# Patient Record
Sex: FEMALE | Race: WHITE
[De-identification: names, ages, dates, MRNs, and addresses within clinical notes are randomized per-mention and may not be internally consistent; named-entity substitution may affect disease eponyms.]

---

## 2017-07-27 ENCOUNTER — HOSPITAL ENCOUNTER (EMERGENCY)
Dept: HOSPITAL 4 - SED | Age: 82
Discharge: HOME | End: 2017-07-27
Payer: MEDICARE

## 2017-07-27 VITALS — BODY MASS INDEX: 29.99 KG/M2 | HEIGHT: 65 IN | WEIGHT: 180 LBS

## 2017-07-27 VITALS — SYSTOLIC BLOOD PRESSURE: 132 MMHG

## 2017-07-27 VITALS — SYSTOLIC BLOOD PRESSURE: 147 MMHG

## 2017-07-27 DIAGNOSIS — I50.9: ICD-10-CM
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
## 2017-07-27 DIAGNOSIS — K92.1: ICD-10-CM

## 2017-07-27 DIAGNOSIS — Z88.8: ICD-10-CM
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
## 2017-07-27 DIAGNOSIS — Z79.899: ICD-10-CM
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
## 2017-07-27 DIAGNOSIS — Z95.0: ICD-10-CM

## 2017-07-27 DIAGNOSIS — S70.01XA: Primary | ICD-10-CM

## 2017-07-27 LAB
ALBUMIN SERPL BCP-MCNC: 3.5 G/DL (ref 3.4–4.8)
ALT SERPL W P-5'-P-CCNC: 21 U/L (ref 12–78)
ANION GAP SERPL CALCULATED.3IONS-SCNC: 6 MMOL/L (ref 5–15)
AST SERPL W P-5'-P-CCNC: 22 U/L (ref 10–37)
BASOPHILS # BLD AUTO: 0 K/UL (ref 0–0.2)
BASOPHILS NFR BLD AUTO: 1 % (ref 0–2)
BILIRUB SERPL-MCNC: 0.5 MG/DL (ref 0–1)
BUN SERPL-MCNC: 18 MG/DL (ref 8–21)
CALCIUM SERPL-MCNC: 8.6 MG/DL (ref 8.4–11)
CHLORIDE SERPL-SCNC: 93 MMOL/L (ref 98–107)
CREAT SERPL-MCNC: 1 MG/DL (ref 0.55–1.3)
EOSINOPHIL # BLD AUTO: 0.2 K/UL (ref 0–0.4)
EOSINOPHIL NFR BLD AUTO: 5.3 % (ref 0–4)
ERYTHROCYTE [DISTWIDTH] IN BLOOD BY AUTOMATED COUNT: 12.6 % (ref 9–15)
GFR SERPL CREATININE-BSD FRML MDRD: (no result) ML/MIN (ref 90–?)
GLUCOSE SERPL-MCNC: 113 MG/DL (ref 70–99)
HCT VFR BLD AUTO: 31.7 % (ref 36–48)
HGB BLD-MCNC: 10.5 G/DL (ref 12–16)
INR PPP: 1.1 (ref 0.8–1.2)
LYMPHOCYTES # BLD AUTO: 0.9 K/UL (ref 1–5.5)
LYMPHOCYTES NFR BLD AUTO: 19.8 % (ref 20.5–51.5)
MCH RBC QN AUTO: 32 PG (ref 27–31)
MCHC RBC AUTO-ENTMCNC: 33 % (ref 32–36)
MCV RBC AUTO: 96 FL (ref 79–98)
MONOCYTES # BLD MANUAL: 0.6 K/UL (ref 0–1)
MONOCYTES # BLD MANUAL: 14.3 % (ref 1.7–9.3)
NEUTROPHILS # BLD AUTO: 2.8 K/UL (ref 1.8–7.7)
NEUTROPHILS NFR BLD AUTO: 59.6 % (ref 40–70)
PLATELET # BLD AUTO: 178 K/UL (ref 130–430)
POTASSIUM SERPL-SCNC: 4.3 MMOL/L (ref 3.5–5.1)
PROT SERPL-MCNC: 7.1 G/DL (ref 6.4–8.3)
PROTHROMBIN TIME: 11.4 SECS (ref 9.5–12.5)
RBC # BLD AUTO: 3.31 MIL/UL (ref 4.2–6.2)
SODIUM SERPLBLD-SCNC: 125 MMOL/L (ref 136–145)
WBC # BLD AUTO: 4.5 K/UL (ref 4.8–10.8)

## 2017-07-27 PROCEDURE — 96375 TX/PRO/DX INJ NEW DRUG ADDON: CPT

## 2017-07-27 PROCEDURE — 87040 BLOOD CULTURE FOR BACTERIA: CPT

## 2017-07-27 PROCEDURE — 83605 ASSAY OF LACTIC ACID: CPT

## 2017-07-27 PROCEDURE — 84484 ASSAY OF TROPONIN QUANT: CPT

## 2017-07-27 PROCEDURE — 36415 COLL VENOUS BLD VENIPUNCTURE: CPT

## 2017-07-27 PROCEDURE — 85610 PROTHROMBIN TIME: CPT

## 2017-07-27 PROCEDURE — 80053 COMPREHEN METABOLIC PANEL: CPT

## 2017-07-27 PROCEDURE — 71010: CPT

## 2017-07-27 PROCEDURE — 83880 ASSAY OF NATRIURETIC PEPTIDE: CPT

## 2017-07-27 PROCEDURE — 85025 COMPLETE CBC W/AUTO DIFF WBC: CPT

## 2017-07-27 PROCEDURE — 93005 ELECTROCARDIOGRAM TRACING: CPT

## 2017-07-27 PROCEDURE — 99285 EMERGENCY DEPT VISIT HI MDM: CPT

## 2017-07-27 PROCEDURE — 96365 THER/PROPH/DIAG IV INF INIT: CPT

## 2017-07-27 PROCEDURE — C9113 INJ PANTOPRAZOLE SODIUM, VIA: HCPCS

## 2017-07-27 NOTE — NUR
Patient to ED for eval of low back, left hip and arm pain s/p non-syncopal fall 
on 7/5/17. Seen at outside hospital, had x-rays which were negative. Patient 
has had episodes of coughing up blood-taken off blood thinners per patient. 
Patient to bed 3 to await MD evaluation-will continue to observe and assess. 
Family remains at bedside.

## 2017-07-27 NOTE — NUR
Patient given written and verbal discharge instructions and verbalizes 
understanding.  ER MD JULIAN discussed with patient the results and treatment 
provided. Patient in stable condition. ID arm band removed. IV catheter removed 
intact and dressing applied, no active bleeding.

Rx of NORCO , OMEPRAZOLE, LASIX given. Patient educated on pain 
management and to follow up with PMD. Pain Scale 2/10.

Opportunity for questions provided and answered.

## 2023-10-15 ENCOUNTER — HOSPITAL ENCOUNTER (INPATIENT)
Dept: HOSPITAL 4 - SED | Age: 88
LOS: 7 days | Discharge: HOME HEALTH | DRG: 871 | End: 2023-10-22
Attending: INTERNAL MEDICINE | Admitting: INTERNAL MEDICINE
Payer: COMMERCIAL

## 2023-10-15 VITALS — WEIGHT: 216 LBS | HEIGHT: 65 IN | BODY MASS INDEX: 35.99 KG/M2

## 2023-10-15 VITALS — RESPIRATION RATE: 18 BRPM | OXYGEN SATURATION: 94 % | SYSTOLIC BLOOD PRESSURE: 110 MMHG | TEMPERATURE: 98.4 F

## 2023-10-15 VITALS — OXYGEN SATURATION: 100 %

## 2023-10-15 VITALS — HEART RATE: 74 BPM | TEMPERATURE: 98.2 F | RESPIRATION RATE: 18 BRPM | SYSTOLIC BLOOD PRESSURE: 109 MMHG

## 2023-10-15 VITALS
HEART RATE: 75 BPM | TEMPERATURE: 98.3 F | RESPIRATION RATE: 20 BRPM | OXYGEN SATURATION: 98 % | SYSTOLIC BLOOD PRESSURE: 146 MMHG

## 2023-10-15 DIAGNOSIS — E44.1: ICD-10-CM

## 2023-10-15 DIAGNOSIS — Z20.822: ICD-10-CM

## 2023-10-15 DIAGNOSIS — Z90.49: ICD-10-CM

## 2023-10-15 DIAGNOSIS — I50.43: ICD-10-CM

## 2023-10-15 DIAGNOSIS — L03.116: ICD-10-CM

## 2023-10-15 DIAGNOSIS — D62: ICD-10-CM

## 2023-10-15 DIAGNOSIS — Z79.899: ICD-10-CM

## 2023-10-15 DIAGNOSIS — R65.21: ICD-10-CM

## 2023-10-15 DIAGNOSIS — Z95.0: ICD-10-CM

## 2023-10-15 DIAGNOSIS — Z88.6: ICD-10-CM

## 2023-10-15 DIAGNOSIS — J96.00: ICD-10-CM

## 2023-10-15 DIAGNOSIS — N18.9: ICD-10-CM

## 2023-10-15 DIAGNOSIS — K92.2: ICD-10-CM

## 2023-10-15 DIAGNOSIS — E80.6: ICD-10-CM

## 2023-10-15 DIAGNOSIS — Z79.01: ICD-10-CM

## 2023-10-15 DIAGNOSIS — I34.0: ICD-10-CM

## 2023-10-15 DIAGNOSIS — D61.818: ICD-10-CM

## 2023-10-15 DIAGNOSIS — Z92.21: ICD-10-CM

## 2023-10-15 DIAGNOSIS — Z86.73: ICD-10-CM

## 2023-10-15 DIAGNOSIS — B96.20: ICD-10-CM

## 2023-10-15 DIAGNOSIS — L03.115: ICD-10-CM

## 2023-10-15 DIAGNOSIS — Z86.79: ICD-10-CM

## 2023-10-15 DIAGNOSIS — A41.9: Primary | ICD-10-CM

## 2023-10-15 DIAGNOSIS — I31.39: ICD-10-CM

## 2023-10-15 DIAGNOSIS — Z85.3: ICD-10-CM

## 2023-10-15 DIAGNOSIS — N39.0: ICD-10-CM

## 2023-10-15 DIAGNOSIS — N17.0: ICD-10-CM

## 2023-10-15 DIAGNOSIS — J18.9: ICD-10-CM

## 2023-10-15 DIAGNOSIS — I87.8: ICD-10-CM

## 2023-10-15 DIAGNOSIS — I48.20: ICD-10-CM

## 2023-10-15 DIAGNOSIS — R58: ICD-10-CM

## 2023-10-15 DIAGNOSIS — I13.0: ICD-10-CM

## 2023-10-15 LAB
ALBUMIN SERPL BCP-MCNC: 3.4 G/DL (ref 3.4–4.8)
ALT SERPL W P-5'-P-CCNC: 17 U/L (ref 12–78)
AMYLASE SERPL-CCNC: 33 U/L (ref 0–100)
ANION GAP SERPL CALCULATED.3IONS-SCNC: 10 MMOL/L (ref 5–15)
ANISOCYTOSIS BLD QL: (no result)
AST SERPL W P-5'-P-CCNC: 29 U/L (ref 10–37)
BASOPHILS NFR BLD MANUAL: 0 % (ref 0–2)
BILIRUB SERPL-MCNC: 2.6 MG/DL (ref 0–1)
BUN SERPL-MCNC: 20 MG/DL (ref 8–21)
CALCIUM SERPL-MCNC: 9.7 MG/DL (ref 8.4–11)
CHLORIDE SERPL-SCNC: 106 MMOL/L (ref 98–107)
CO2 SERPLBLD-SCNC: 26 MMOL/L (ref 23–29)
CREAT SERPL-MCNC: 0.95 MG/DL (ref 0.55–1.3)
EOSINOPHIL NFR BLD MANUAL: 0 % (ref 0–7)
ERYTHROCYTE [DISTWIDTH] IN BLOOD BY AUTOMATED COUNT: 14.6 % (ref 9–15)
GFR SERPL CREATININE-BSD FRML MDRD: (no result) ML/MIN (ref 90–?)
GLUCOSE SERPL-MCNC: 98 MG/DL (ref 74–106)
HCT VFR BLD AUTO: 35.9 % (ref 36–48)
HGB BLD-MCNC: 11.6 G/DL (ref 12–16)
HYPOCHROMIA BLD QL: (no result)
INR PPP: 1.3 (ref 0.8–1.2)
LIPASE SERPL-CCNC: 49 U/L (ref 73–393)
LYMPHOCYTES NFR BLD MANUAL: 12 % (ref 20–46)
MACROCYTES BLD QL: SLIGHT
MCH RBC QN AUTO: 33 PG (ref 27–31)
MCHC RBC AUTO-ENTMCNC: 32 % (ref 32–36)
MCV RBC AUTO: 103 FL (ref 79–98)
MONOCYTES # BLD MANUAL: 3 % (ref 0–11)
NEUTS BAND NFR BLD MANUAL: 25 % (ref 0–6)
PLATELET # BLD AUTO: 92 K/UL (ref 130–430)
PLATELET BLD QL SMEAR: (no result)
POTASSIUM SERPL-SCNC: 3.6 MMOL/L (ref 3.5–5.1)
PROT SERPL-MCNC: 8.1 G/DL (ref 6.4–8.3)
PROTHROMBIN TIME: 13.5 SECS (ref 9.5–12.5)
RBC # BLD AUTO: 3.5 MIL/UL (ref 4.2–6.2)
SMUDGE CELLS BLD QL SMEAR: (no result)
SODIUM SERPLBLD-SCNC: 142 MMOL/L (ref 136–145)
VARIANT LYMPHS NFR BLD MANUAL: 0 % (ref 0–0)
WBC # BLD AUTO: 4.4 K/UL (ref 4.8–10.8)

## 2023-10-15 PROCEDURE — P9046 ALBUMIN (HUMAN), 25%, 20 ML: HCPCS

## 2023-10-15 PROCEDURE — G0378 HOSPITAL OBSERVATION PER HR: HCPCS

## 2023-10-15 PROCEDURE — C9113 INJ PANTOPRAZOLE SODIUM, VIA: HCPCS

## 2023-10-15 PROCEDURE — P9059 PLASMA, FRZ BETWEEN 8-24HOUR: HCPCS

## 2023-10-15 RX ADMIN — SODIUM CHLORIDE SCH MG: 9 INJECTION, SOLUTION INTRAVENOUS at 21:57

## 2023-10-15 RX ADMIN — BUMETANIDE SCH MG: 1 TABLET ORAL at 20:39

## 2023-10-16 VITALS
RESPIRATION RATE: 20 BRPM | TEMPERATURE: 98.5 F | SYSTOLIC BLOOD PRESSURE: 88 MMHG | HEART RATE: 77 BPM | OXYGEN SATURATION: 92 %

## 2023-10-16 VITALS — SYSTOLIC BLOOD PRESSURE: 99 MMHG | RESPIRATION RATE: 29 BRPM | OXYGEN SATURATION: 89 % | HEART RATE: 79 BPM

## 2023-10-16 VITALS
HEART RATE: 65 BPM | RESPIRATION RATE: 18 BRPM | OXYGEN SATURATION: 93 % | SYSTOLIC BLOOD PRESSURE: 94 MMHG | TEMPERATURE: 99.7 F

## 2023-10-16 VITALS — SYSTOLIC BLOOD PRESSURE: 110 MMHG | RESPIRATION RATE: 10 BRPM | OXYGEN SATURATION: 89 % | HEART RATE: 81 BPM

## 2023-10-16 VITALS — OXYGEN SATURATION: 2 %

## 2023-10-16 VITALS
TEMPERATURE: 98.6 F | SYSTOLIC BLOOD PRESSURE: 99 MMHG | RESPIRATION RATE: 19 BRPM | OXYGEN SATURATION: 92 % | HEART RATE: 79 BPM

## 2023-10-16 VITALS — SYSTOLIC BLOOD PRESSURE: 97 MMHG | RESPIRATION RATE: 16 BRPM | OXYGEN SATURATION: 90 % | HEART RATE: 80 BPM

## 2023-10-16 VITALS
RESPIRATION RATE: 18 BRPM | HEART RATE: 76 BPM | SYSTOLIC BLOOD PRESSURE: 94 MMHG | TEMPERATURE: 98.4 F | OXYGEN SATURATION: 95 %

## 2023-10-16 VITALS — OXYGEN SATURATION: 94 %

## 2023-10-16 LAB
ALBUMIN SERPL BCP-MCNC: 2.6 G/DL (ref 3.4–4.8)
ALT SERPL W P-5'-P-CCNC: 17 U/L (ref 12–78)
ANION GAP SERPL CALCULATED.3IONS-SCNC: 10 MMOL/L (ref 5–15)
AST SERPL W P-5'-P-CCNC: 29 U/L (ref 10–37)
BASOPHILS # BLD AUTO: 0 K/UL (ref 0–0.2)
BASOPHILS NFR BLD AUTO: 0.1 % (ref 0–2)
BILIRUB SERPL-MCNC: 3.9 MG/DL (ref 0–1)
BUN SERPL-MCNC: 30 MG/DL (ref 8–21)
CALCIUM SERPL-MCNC: 9 MG/DL (ref 8.4–11)
CHLORIDE SERPL-SCNC: 106 MMOL/L (ref 98–107)
CO2 SERPLBLD-SCNC: 25 MMOL/L (ref 23–29)
CREAT SERPL-MCNC: 1.35 MG/DL (ref 0.55–1.3)
EOSINOPHIL # BLD AUTO: 0 K/UL (ref 0–0.4)
EOSINOPHIL NFR BLD AUTO: 0.1 % (ref 0–4)
ERYTHROCYTE [DISTWIDTH] IN BLOOD BY AUTOMATED COUNT: 14.7 % (ref 9–15)
GFR SERPL CREATININE-BSD FRML MDRD: (no result) ML/MIN (ref 90–?)
GLUCOSE SERPL-MCNC: 94 MG/DL (ref 74–106)
HCT VFR BLD AUTO: 32.3 % (ref 36–48)
HGB BLD-MCNC: 10.7 G/DL (ref 12–16)
INR PPP: 1.7 (ref 0.8–1.2)
LYMPHOCYTES # BLD AUTO: 0.3 K/UL (ref 1–5.5)
LYMPHOCYTES NFR BLD AUTO: 4.9 % (ref 20.5–51.5)
MCH RBC QN AUTO: 33 PG (ref 27–31)
MCHC RBC AUTO-ENTMCNC: 33 % (ref 32–36)
MCV RBC AUTO: 101 FL (ref 79–98)
MONOCYTES # BLD MANUAL: 0.5 K/UL (ref 0–1)
MONOCYTES # BLD MANUAL: 8.3 % (ref 1.7–9.3)
NEUTROPHILS # BLD AUTO: 5.7 K/UL (ref 1.8–7.7)
NEUTROPHILS NFR BLD AUTO: 86.6 % (ref 40–70)
PLATELET # BLD AUTO: 72 K/UL (ref 130–430)
POTASSIUM SERPL-SCNC: 3.9 MMOL/L (ref 3.5–5.1)
PROT SERPL-MCNC: 6.7 G/DL (ref 6.4–8.3)
PROTHROMBIN TIME: 17 SECS (ref 9.5–12.5)
RBC # BLD AUTO: 3.19 MIL/UL (ref 4.2–6.2)
SODIUM SERPLBLD-SCNC: 141 MMOL/L (ref 136–145)
T4 FREE SERPL-MCNC: 1.3 NG/DL (ref 0.6–1.6)
TSH SERPL DL<=0.05 MIU/L-ACNC: 1.04 UIU/ML (ref 0.34–4.82)
WBC # BLD AUTO: 6.5 K/UL (ref 4.8–10.8)

## 2023-10-16 PROCEDURE — 30233K1 TRANSFUSION OF NONAUTOLOGOUS FROZEN PLASMA INTO PERIPHERAL VEIN, PERCUTANEOUS APPROACH: ICD-10-PCS | Performed by: INTERNAL MEDICINE

## 2023-10-16 RX ADMIN — MIDODRINE HYDROCHLORIDE SCH MG: 5 TABLET ORAL at 21:00

## 2023-10-16 RX ADMIN — SODIUM CHLORIDE SCH MLS/HR: 9 INJECTION, SOLUTION INTRAVENOUS at 17:45

## 2023-10-16 RX ADMIN — SODIUM CHLORIDE SCH MG: 9 INJECTION, SOLUTION INTRAVENOUS at 12:46

## 2023-10-16 RX ADMIN — AMIODARONE HYDROCHLORIDE SCH MG: 200 TABLET ORAL at 09:00

## 2023-10-16 RX ADMIN — BUMETANIDE SCH MG: 1 TABLET ORAL at 09:00

## 2023-10-16 RX ADMIN — MINERAL SUPPLEMENT IRON 300 MG / 5 ML STRENGTH LIQUID 100 PER BOX UNFLAVORED SCH MG: at 09:49

## 2023-10-16 RX ADMIN — BUMETANIDE SCH MG: 1 TABLET ORAL at 22:06

## 2023-10-16 RX ADMIN — Medication SCH MG: at 09:00

## 2023-10-16 RX ADMIN — PIPERACILLIN AND TAZOBACTAM SCH MLS/HR: 2; .25 INJECTION, POWDER, LYOPHILIZED, FOR SOLUTION INTRAVENOUS; PARENTERAL at 22:05

## 2023-10-16 RX ADMIN — DIGOXIN SCH MG: 125 TABLET ORAL at 09:00

## 2023-10-16 RX ADMIN — SODIUM CHLORIDE SCH MG: 9 INJECTION, SOLUTION INTRAVENOUS at 21:10

## 2023-10-16 RX ADMIN — Medication SCH UNIT: at 09:49

## 2023-10-16 RX ADMIN — PIPERACILLIN AND TAZOBACTAM SCH MLS/HR: 2; .25 INJECTION, POWDER, LYOPHILIZED, FOR SOLUTION INTRAVENOUS; PARENTERAL at 17:20

## 2023-10-16 RX ADMIN — MIDODRINE HYDROCHLORIDE SCH MG: 5 TABLET ORAL at 15:00

## 2023-10-17 VITALS — RESPIRATION RATE: 18 BRPM | SYSTOLIC BLOOD PRESSURE: 102 MMHG | OXYGEN SATURATION: 91 % | HEART RATE: 81 BPM

## 2023-10-17 VITALS
OXYGEN SATURATION: 98 % | HEART RATE: 81 BPM | RESPIRATION RATE: 22 BRPM | TEMPERATURE: 97.8 F | SYSTOLIC BLOOD PRESSURE: 103 MMHG

## 2023-10-17 VITALS — HEART RATE: 78 BPM | SYSTOLIC BLOOD PRESSURE: 91 MMHG | RESPIRATION RATE: 17 BRPM | OXYGEN SATURATION: 93 %

## 2023-10-17 VITALS
OXYGEN SATURATION: 97 % | SYSTOLIC BLOOD PRESSURE: 98 MMHG | HEART RATE: 80 BPM | RESPIRATION RATE: 21 BRPM | TEMPERATURE: 97.9 F

## 2023-10-17 VITALS
TEMPERATURE: 98.1 F | RESPIRATION RATE: 22 BRPM | HEART RATE: 80 BPM | SYSTOLIC BLOOD PRESSURE: 95 MMHG | OXYGEN SATURATION: 93 %

## 2023-10-17 VITALS — OXYGEN SATURATION: 95 % | SYSTOLIC BLOOD PRESSURE: 104 MMHG | RESPIRATION RATE: 20 BRPM | HEART RATE: 79 BPM

## 2023-10-17 VITALS
SYSTOLIC BLOOD PRESSURE: 93 MMHG | RESPIRATION RATE: 21 BRPM | TEMPERATURE: 98.6 F | HEART RATE: 78 BPM | OXYGEN SATURATION: 92 %

## 2023-10-17 VITALS
RESPIRATION RATE: 18 BRPM | OXYGEN SATURATION: 93 % | HEART RATE: 79 BPM | SYSTOLIC BLOOD PRESSURE: 103 MMHG | TEMPERATURE: 98.1 F

## 2023-10-17 VITALS
TEMPERATURE: 97.8 F | SYSTOLIC BLOOD PRESSURE: 121 MMHG | RESPIRATION RATE: 23 BRPM | OXYGEN SATURATION: 94 % | HEART RATE: 79 BPM

## 2023-10-17 VITALS
HEART RATE: 79 BPM | RESPIRATION RATE: 16 BRPM | SYSTOLIC BLOOD PRESSURE: 96 MMHG | TEMPERATURE: 98.1 F | OXYGEN SATURATION: 96 %

## 2023-10-17 VITALS
SYSTOLIC BLOOD PRESSURE: 103 MMHG | HEART RATE: 79 BPM | RESPIRATION RATE: 21 BRPM | OXYGEN SATURATION: 93 % | TEMPERATURE: 98.1 F

## 2023-10-17 VITALS — HEART RATE: 81 BPM | OXYGEN SATURATION: 94 % | RESPIRATION RATE: 22 BRPM | SYSTOLIC BLOOD PRESSURE: 104 MMHG

## 2023-10-17 VITALS
SYSTOLIC BLOOD PRESSURE: 97 MMHG | TEMPERATURE: 98 F | RESPIRATION RATE: 14 BRPM | OXYGEN SATURATION: 94 % | HEART RATE: 81 BPM

## 2023-10-17 VITALS
RESPIRATION RATE: 16 BRPM | TEMPERATURE: 98.2 F | HEART RATE: 78 BPM | SYSTOLIC BLOOD PRESSURE: 93 MMHG | OXYGEN SATURATION: 94 %

## 2023-10-17 VITALS — RESPIRATION RATE: 15 BRPM | HEART RATE: 78 BPM | OXYGEN SATURATION: 92 % | SYSTOLIC BLOOD PRESSURE: 90 MMHG

## 2023-10-17 VITALS
OXYGEN SATURATION: 95 % | RESPIRATION RATE: 17 BRPM | SYSTOLIC BLOOD PRESSURE: 97 MMHG | TEMPERATURE: 98 F | HEART RATE: 79 BPM

## 2023-10-17 VITALS — OXYGEN SATURATION: 93 % | HEART RATE: 80 BPM | RESPIRATION RATE: 18 BRPM | SYSTOLIC BLOOD PRESSURE: 93 MMHG

## 2023-10-17 VITALS
RESPIRATION RATE: 19 BRPM | TEMPERATURE: 98.1 F | SYSTOLIC BLOOD PRESSURE: 98 MMHG | HEART RATE: 79 BPM | OXYGEN SATURATION: 93 %

## 2023-10-17 VITALS — OXYGEN SATURATION: 96 %

## 2023-10-17 VITALS — SYSTOLIC BLOOD PRESSURE: 91 MMHG | HEART RATE: 79 BPM | OXYGEN SATURATION: 91 % | RESPIRATION RATE: 18 BRPM

## 2023-10-17 VITALS
HEART RATE: 78 BPM | RESPIRATION RATE: 22 BRPM | TEMPERATURE: 97.7 F | OXYGEN SATURATION: 95 % | SYSTOLIC BLOOD PRESSURE: 114 MMHG

## 2023-10-17 VITALS
RESPIRATION RATE: 20 BRPM | OXYGEN SATURATION: 96 % | TEMPERATURE: 97.8 F | SYSTOLIC BLOOD PRESSURE: 92 MMHG | HEART RATE: 78 BPM

## 2023-10-17 VITALS — HEART RATE: 80 BPM | OXYGEN SATURATION: 97 % | SYSTOLIC BLOOD PRESSURE: 98 MMHG

## 2023-10-17 VITALS
SYSTOLIC BLOOD PRESSURE: 97 MMHG | HEART RATE: 79 BPM | OXYGEN SATURATION: 95 % | RESPIRATION RATE: 23 BRPM | TEMPERATURE: 97.9 F

## 2023-10-17 VITALS — HEART RATE: 81 BPM | OXYGEN SATURATION: 92 % | RESPIRATION RATE: 14 BRPM | SYSTOLIC BLOOD PRESSURE: 89 MMHG

## 2023-10-17 VITALS — SYSTOLIC BLOOD PRESSURE: 101 MMHG | OXYGEN SATURATION: 96 % | RESPIRATION RATE: 20 BRPM | HEART RATE: 78 BPM

## 2023-10-17 VITALS — OXYGEN SATURATION: 98 %

## 2023-10-17 VITALS — OXYGEN SATURATION: 94 %

## 2023-10-17 LAB
ALBUMIN SERPL BCP-MCNC: 2.6 G/DL (ref 3.4–4.8)
ALT SERPL W P-5'-P-CCNC: 18 U/L (ref 12–78)
ANION GAP SERPL CALCULATED.3IONS-SCNC: 7 MMOL/L (ref 5–15)
APPEARANCE UR: CLEAR
ARTERIAL PATENCY WRIST A: POSITIVE
AST SERPL W P-5'-P-CCNC: 24 U/L (ref 10–37)
BACTERIA URNS QL MICRO: (no result) /HPF
BASE EXCESS BLDA CALC-SCNC: -3 MMOL/L (ref -3–3)
BASOPHILS # BLD AUTO: 0 K/UL (ref 0–0.2)
BASOPHILS NFR BLD AUTO: 0.1 % (ref 0–2)
BILIRUB SERPL-MCNC: 3.3 MG/DL (ref 0–1)
BILIRUB UR QL STRIP: (no result)
BUN SERPL-MCNC: 41 MG/DL (ref 8–21)
CALCIUM SERPL-MCNC: 8.7 MG/DL (ref 8.4–11)
CHLORIDE SERPL-SCNC: 105 MMOL/L (ref 98–107)
CHOLEST SERPL-MCNC: 69 MG/DL (ref ?–200)
CO2 SERPLBLD-SCNC: 26 MMOL/L (ref 23–29)
COLOR UR: YELLOW
CREAT SERPL-MCNC: 1.78 MG/DL (ref 0.55–1.3)
DIGOXIN SERPL-MCNC: 0.5 NG/ML (ref 0.8–2)
EOSINOPHIL # BLD AUTO: 0 K/UL (ref 0–0.4)
EOSINOPHIL NFR BLD AUTO: 0.3 % (ref 0–4)
ERYTHROCYTE [DISTWIDTH] IN BLOOD BY AUTOMATED COUNT: 15 % (ref 9–15)
GFR SERPL CREATININE-BSD FRML MDRD: (no result) ML/MIN (ref 90–?)
GLUCOSE SERPL-MCNC: 95 MG/DL (ref 74–106)
GLUCOSE UR STRIP-MCNC: NEGATIVE MG/DL
HCO3 BLDA-SCNC: 23.6 MMOL/L (ref 21–27)
HCT VFR BLD AUTO: 32.3 % (ref 36–48)
HDLC SERPL-MCNC: 34 MG/DL (ref 55–?)
HGB BLD-MCNC: 10.4 G/DL (ref 12–16)
HGB UR QL STRIP: (no result)
INHALED O2 CONCENTRATION: 32 % (ref 0.21–100)
INR PPP: 1.6 (ref 0.8–1.2)
KETONES UR STRIP-MCNC: NEGATIVE MG/DL
LEUKOCYTE ESTERASE UR QL STRIP: (no result)
LYMPHOCYTES # BLD AUTO: 0.4 K/UL (ref 1–5.5)
LYMPHOCYTES NFR BLD AUTO: 3.9 % (ref 20.5–51.5)
MCH RBC QN AUTO: 33 PG (ref 27–31)
MCHC RBC AUTO-ENTMCNC: 32 % (ref 32–36)
MCV RBC AUTO: 103 FL (ref 79–98)
MONOCYTES # BLD MANUAL: 1 K/UL (ref 0–1)
MONOCYTES # BLD MANUAL: 10.2 % (ref 1.7–9.3)
NEUTROPHILS # BLD AUTO: 8.4 K/UL (ref 1.8–7.7)
NEUTROPHILS NFR BLD AUTO: 85.5 % (ref 40–70)
NITRITE UR QL STRIP: NEGATIVE
PCO2 BLDA: 47.9 MMHG (ref 35–45)
PH BLDA: 7.31 [PH] (ref 7.35–7.45)
PH UR STRIP: 5.5 [PH] (ref 5–8)
PLATELET # BLD AUTO: 74 K/UL (ref 130–430)
PO2 BLDA: 96.1 MMHG (ref 75–100)
POTASSIUM SERPL-SCNC: 4.4 MMOL/L (ref 3.5–5.1)
PROT SERPL-MCNC: 6.8 G/DL (ref 6.4–8.3)
PROT UR QL STRIP: (no result)
PROTHROMBIN TIME: 16 SECS (ref 9.5–12.5)
RBC # BLD AUTO: 3.15 MIL/UL (ref 4.2–6.2)
RBC #/AREA URNS HPF: (no result) /HPF (ref 0–3)
SAO2 % BLDA: 96.7 % (ref 94–100)
SODIUM SERPLBLD-SCNC: 138 MMOL/L (ref 136–145)
SP GR UR STRIP: 1.02 (ref 1–1.03)
T4 FREE SERPL-MCNC: 1.2 NG/DL (ref 0.6–1.6)
TRIGL SERPL-MCNC: 47 MG/DL (ref 30–150)
TSH SERPL DL<=0.05 MIU/L-ACNC: 1.17 UIU/ML (ref 0.34–4.82)
UROBILINOGEN UR STRIP-MCNC: 1 MG/DL (ref 0.2–1)
WBC # BLD AUTO: 9.8 K/UL (ref 4.8–10.8)
WBC #/AREA URNS HPF: (no result) /HPF (ref 0–3)

## 2023-10-17 RX ADMIN — AMIODARONE HYDROCHLORIDE SCH MG: 200 TABLET ORAL at 08:21

## 2023-10-17 RX ADMIN — PIPERACILLIN AND TAZOBACTAM SCH MLS/HR: 2; .25 INJECTION, POWDER, LYOPHILIZED, FOR SOLUTION INTRAVENOUS; PARENTERAL at 21:46

## 2023-10-17 RX ADMIN — ALBUMIN (HUMAN) SCH MLS/HR: 5 SOLUTION INTRAVENOUS at 21:39

## 2023-10-17 RX ADMIN — PROPARACAINE HYDROCHLORIDE PRN ML: 5 SOLUTION/ DROPS OPHTHALMIC at 15:23

## 2023-10-17 RX ADMIN — PIPERACILLIN AND TAZOBACTAM SCH MLS/HR: 2; .25 INJECTION, POWDER, LYOPHILIZED, FOR SOLUTION INTRAVENOUS; PARENTERAL at 15:30

## 2023-10-17 RX ADMIN — MIDODRINE HYDROCHLORIDE SCH MG: 5 TABLET ORAL at 15:24

## 2023-10-17 RX ADMIN — SODIUM CHLORIDE SCH MG: 9 INJECTION, SOLUTION INTRAVENOUS at 21:39

## 2023-10-17 RX ADMIN — Medication SCH UNIT: at 08:24

## 2023-10-17 RX ADMIN — Medication SCH MG: at 08:23

## 2023-10-17 RX ADMIN — DIGOXIN SCH MG: 125 TABLET ORAL at 08:22

## 2023-10-17 RX ADMIN — MIDODRINE HYDROCHLORIDE SCH MG: 5 TABLET ORAL at 08:24

## 2023-10-17 RX ADMIN — SODIUM CHLORIDE SCH MLS/HR: 9 INJECTION, SOLUTION INTRAVENOUS at 13:55

## 2023-10-17 RX ADMIN — BUMETANIDE SCH MG: 1 TABLET ORAL at 21:45

## 2023-10-17 RX ADMIN — PIPERACILLIN AND TAZOBACTAM SCH MLS/HR: 2; .25 INJECTION, POWDER, LYOPHILIZED, FOR SOLUTION INTRAVENOUS; PARENTERAL at 09:38

## 2023-10-17 RX ADMIN — MIDODRINE HYDROCHLORIDE SCH MG: 5 TABLET ORAL at 21:45

## 2023-10-17 RX ADMIN — SODIUM CHLORIDE SCH MLS/HR: 9 INJECTION, SOLUTION INTRAVENOUS at 21:46

## 2023-10-17 RX ADMIN — ALBUMIN (HUMAN) SCH MLS/HR: 5 SOLUTION INTRAVENOUS at 07:50

## 2023-10-17 RX ADMIN — MINERAL SUPPLEMENT IRON 300 MG / 5 ML STRENGTH LIQUID 100 PER BOX UNFLAVORED SCH MG: at 08:22

## 2023-10-17 RX ADMIN — ALBUMIN (HUMAN) SCH MLS/HR: 5 SOLUTION INTRAVENOUS at 13:55

## 2023-10-17 RX ADMIN — PIPERACILLIN AND TAZOBACTAM SCH MLS/HR: 2; .25 INJECTION, POWDER, LYOPHILIZED, FOR SOLUTION INTRAVENOUS; PARENTERAL at 03:38

## 2023-10-17 RX ADMIN — BUMETANIDE SCH MG: 1 TABLET ORAL at 08:22

## 2023-10-17 RX ADMIN — PROPARACAINE HYDROCHLORIDE PRN ML: 5 SOLUTION/ DROPS OPHTHALMIC at 21:39

## 2023-10-17 RX ADMIN — SODIUM CHLORIDE SCH MG: 9 INJECTION, SOLUTION INTRAVENOUS at 08:29

## 2023-10-17 RX ADMIN — SODIUM CHLORIDE SCH MLS/HR: 9 INJECTION, SOLUTION INTRAVENOUS at 07:49

## 2023-10-17 RX ADMIN — SODIUM CHLORIDE SCH MLS/HR: 0.9 INJECTION, SOLUTION INTRAVENOUS at 12:46

## 2023-10-18 VITALS
OXYGEN SATURATION: 97 % | HEART RATE: 74 BPM | RESPIRATION RATE: 22 BRPM | SYSTOLIC BLOOD PRESSURE: 109 MMHG | TEMPERATURE: 98 F

## 2023-10-18 VITALS
SYSTOLIC BLOOD PRESSURE: 104 MMHG | HEART RATE: 78 BPM | TEMPERATURE: 97.8 F | OXYGEN SATURATION: 97 % | RESPIRATION RATE: 17 BRPM

## 2023-10-18 VITALS
SYSTOLIC BLOOD PRESSURE: 108 MMHG | TEMPERATURE: 97.8 F | RESPIRATION RATE: 18 BRPM | HEART RATE: 77 BPM | OXYGEN SATURATION: 98 %

## 2023-10-18 VITALS
HEART RATE: 76 BPM | SYSTOLIC BLOOD PRESSURE: 106 MMHG | RESPIRATION RATE: 20 BRPM | OXYGEN SATURATION: 95 % | TEMPERATURE: 97.6 F

## 2023-10-18 VITALS — RESPIRATION RATE: 21 BRPM | OXYGEN SATURATION: 91 % | HEART RATE: 76 BPM | SYSTOLIC BLOOD PRESSURE: 105 MMHG

## 2023-10-18 VITALS — SYSTOLIC BLOOD PRESSURE: 102 MMHG | RESPIRATION RATE: 21 BRPM | OXYGEN SATURATION: 93 % | HEART RATE: 78 BPM

## 2023-10-18 VITALS
SYSTOLIC BLOOD PRESSURE: 105 MMHG | RESPIRATION RATE: 17 BRPM | HEART RATE: 84 BPM | OXYGEN SATURATION: 93 % | TEMPERATURE: 98 F

## 2023-10-18 VITALS
SYSTOLIC BLOOD PRESSURE: 105 MMHG | OXYGEN SATURATION: 96 % | RESPIRATION RATE: 17 BRPM | HEART RATE: 80 BPM | TEMPERATURE: 97.8 F

## 2023-10-18 VITALS
TEMPERATURE: 97.9 F | OXYGEN SATURATION: 98 % | RESPIRATION RATE: 18 BRPM | HEART RATE: 78 BPM | SYSTOLIC BLOOD PRESSURE: 100 MMHG

## 2023-10-18 VITALS
RESPIRATION RATE: 18 BRPM | TEMPERATURE: 97.6 F | OXYGEN SATURATION: 97 % | HEART RATE: 79 BPM | SYSTOLIC BLOOD PRESSURE: 114 MMHG

## 2023-10-18 VITALS
SYSTOLIC BLOOD PRESSURE: 102 MMHG | HEART RATE: 75 BPM | TEMPERATURE: 97.8 F | RESPIRATION RATE: 20 BRPM | OXYGEN SATURATION: 96 %

## 2023-10-18 VITALS
HEART RATE: 79 BPM | OXYGEN SATURATION: 98 % | SYSTOLIC BLOOD PRESSURE: 119 MMHG | RESPIRATION RATE: 21 BRPM | TEMPERATURE: 97.6 F

## 2023-10-18 VITALS — SYSTOLIC BLOOD PRESSURE: 121 MMHG | RESPIRATION RATE: 20 BRPM | HEART RATE: 80 BPM | OXYGEN SATURATION: 98 %

## 2023-10-18 VITALS
OXYGEN SATURATION: 94 % | SYSTOLIC BLOOD PRESSURE: 96 MMHG | TEMPERATURE: 97.8 F | HEART RATE: 88 BPM | RESPIRATION RATE: 25 BRPM

## 2023-10-18 VITALS
OXYGEN SATURATION: 100 % | TEMPERATURE: 98 F | SYSTOLIC BLOOD PRESSURE: 120 MMHG | RESPIRATION RATE: 13 BRPM | HEART RATE: 80 BPM

## 2023-10-18 VITALS — HEART RATE: 76 BPM | RESPIRATION RATE: 21 BRPM | SYSTOLIC BLOOD PRESSURE: 106 MMHG | OXYGEN SATURATION: 92 %

## 2023-10-18 VITALS
OXYGEN SATURATION: 96 % | TEMPERATURE: 98.1 F | HEART RATE: 77 BPM | SYSTOLIC BLOOD PRESSURE: 109 MMHG | RESPIRATION RATE: 22 BRPM

## 2023-10-18 VITALS — SYSTOLIC BLOOD PRESSURE: 109 MMHG | OXYGEN SATURATION: 94 % | HEART RATE: 76 BPM | RESPIRATION RATE: 20 BRPM

## 2023-10-18 VITALS
RESPIRATION RATE: 22 BRPM | HEART RATE: 78 BPM | TEMPERATURE: 98 F | OXYGEN SATURATION: 94 % | SYSTOLIC BLOOD PRESSURE: 108 MMHG

## 2023-10-18 VITALS — RESPIRATION RATE: 20 BRPM | SYSTOLIC BLOOD PRESSURE: 105 MMHG | OXYGEN SATURATION: 96 % | HEART RATE: 78 BPM

## 2023-10-18 VITALS — HEART RATE: 75 BPM | SYSTOLIC BLOOD PRESSURE: 106 MMHG | OXYGEN SATURATION: 95 % | RESPIRATION RATE: 23 BRPM

## 2023-10-18 VITALS — OXYGEN SATURATION: 97 %

## 2023-10-18 VITALS — SYSTOLIC BLOOD PRESSURE: 121 MMHG | RESPIRATION RATE: 22 BRPM | HEART RATE: 80 BPM | OXYGEN SATURATION: 88 %

## 2023-10-18 VITALS
OXYGEN SATURATION: 97 % | SYSTOLIC BLOOD PRESSURE: 104 MMHG | HEART RATE: 81 BPM | TEMPERATURE: 97.9 F | RESPIRATION RATE: 22 BRPM

## 2023-10-18 VITALS — HEART RATE: 79 BPM | OXYGEN SATURATION: 99 % | SYSTOLIC BLOOD PRESSURE: 106 MMHG | RESPIRATION RATE: 23 BRPM

## 2023-10-18 LAB
ALBUMIN SERPL BCP-MCNC: 3 G/DL (ref 3.4–4.8)
ALT SERPL W P-5'-P-CCNC: 15 U/L (ref 12–78)
ANION GAP SERPL CALCULATED.3IONS-SCNC: 8 MMOL/L (ref 5–15)
AST SERPL W P-5'-P-CCNC: 21 U/L (ref 10–37)
BASOPHILS # BLD AUTO: 0 K/UL (ref 0–0.2)
BASOPHILS NFR BLD AUTO: 0.2 % (ref 0–2)
BILIRUB SERPL-MCNC: 3.1 MG/DL (ref 0–1)
BUN SERPL-MCNC: 48 MG/DL (ref 8–21)
CALCIUM SERPL-MCNC: 9.1 MG/DL (ref 8.4–11)
CHLORIDE SERPL-SCNC: 102 MMOL/L (ref 98–107)
CO2 SERPLBLD-SCNC: 26 MMOL/L (ref 23–29)
CREAT SERPL-MCNC: 1.98 MG/DL (ref 0.55–1.3)
EOSINOPHIL # BLD AUTO: 0.1 K/UL (ref 0–0.4)
EOSINOPHIL NFR BLD AUTO: 1.4 % (ref 0–4)
ERYTHROCYTE [DISTWIDTH] IN BLOOD BY AUTOMATED COUNT: 15.1 % (ref 9–15)
GFR SERPL CREATININE-BSD FRML MDRD: (no result) ML/MIN (ref 90–?)
GLUCOSE SERPL-MCNC: 112 MG/DL (ref 74–106)
HCT VFR BLD AUTO: 33.6 % (ref 36–48)
HGB BLD-MCNC: 10.7 G/DL (ref 12–16)
LYMPHOCYTES # BLD AUTO: 0.4 K/UL (ref 1–5.5)
LYMPHOCYTES NFR BLD AUTO: 4.5 % (ref 20.5–51.5)
MCH RBC QN AUTO: 33 PG (ref 27–31)
MCHC RBC AUTO-ENTMCNC: 32 % (ref 32–36)
MCV RBC AUTO: 104 FL (ref 79–98)
MONOCYTES # BLD MANUAL: 0.9 K/UL (ref 0–1)
MONOCYTES # BLD MANUAL: 9.7 % (ref 1.7–9.3)
NEUTROPHILS # BLD AUTO: 7.8 K/UL (ref 1.8–7.7)
NEUTROPHILS NFR BLD AUTO: 84.2 % (ref 40–70)
PHOSPHATE SERPL-MCNC: 3.9 MG/DL (ref 2.7–4.5)
PLATELET # BLD AUTO: 82 K/UL (ref 130–430)
POTASSIUM SERPL-SCNC: 4.3 MMOL/L (ref 3.5–5.1)
PROT SERPL-MCNC: 7.4 G/DL (ref 6.4–8.3)
RBC # BLD AUTO: 3.24 MIL/UL (ref 4.2–6.2)
SODIUM SERPLBLD-SCNC: 136 MMOL/L (ref 136–145)
SODIUM UR-SCNC: 13 MMOL/L (ref 40–220)
WBC # BLD AUTO: 9.3 K/UL (ref 4.8–10.8)

## 2023-10-18 RX ADMIN — Medication SCH MG: at 09:19

## 2023-10-18 RX ADMIN — PIPERACILLIN AND TAZOBACTAM SCH MLS/HR: 2; .25 INJECTION, POWDER, LYOPHILIZED, FOR SOLUTION INTRAVENOUS; PARENTERAL at 04:38

## 2023-10-18 RX ADMIN — SODIUM CHLORIDE SCH MG: 9 INJECTION, SOLUTION INTRAVENOUS at 22:39

## 2023-10-18 RX ADMIN — Medication SCH UNIT: at 09:19

## 2023-10-18 RX ADMIN — DIGOXIN SCH MG: 125 TABLET ORAL at 09:19

## 2023-10-18 RX ADMIN — BUMETANIDE SCH MG: 1 TABLET ORAL at 09:20

## 2023-10-18 RX ADMIN — MIDODRINE HYDROCHLORIDE SCH MG: 5 TABLET ORAL at 22:39

## 2023-10-18 RX ADMIN — PROPARACAINE HYDROCHLORIDE PRN ML: 5 SOLUTION/ DROPS OPHTHALMIC at 22:40

## 2023-10-18 RX ADMIN — SODIUM CHLORIDE SCH MG: 9 INJECTION, SOLUTION INTRAVENOUS at 09:20

## 2023-10-18 RX ADMIN — PIPERACILLIN AND TAZOBACTAM SCH MLS/HR: 2; .25 INJECTION, POWDER, LYOPHILIZED, FOR SOLUTION INTRAVENOUS; PARENTERAL at 22:42

## 2023-10-18 RX ADMIN — SODIUM CHLORIDE SCH MLS/HR: 9 INJECTION, SOLUTION INTRAVENOUS at 22:41

## 2023-10-18 RX ADMIN — BUMETANIDE SCH MG: 1 TABLET ORAL at 22:39

## 2023-10-18 RX ADMIN — SODIUM CHLORIDE SCH MLS/HR: 0.9 INJECTION, SOLUTION INTRAVENOUS at 12:39

## 2023-10-18 RX ADMIN — PIPERACILLIN AND TAZOBACTAM SCH MLS/HR: 2; .25 INJECTION, POWDER, LYOPHILIZED, FOR SOLUTION INTRAVENOUS; PARENTERAL at 09:21

## 2023-10-18 RX ADMIN — MIDODRINE HYDROCHLORIDE SCH MG: 5 TABLET ORAL at 16:01

## 2023-10-18 RX ADMIN — MINERAL SUPPLEMENT IRON 300 MG / 5 ML STRENGTH LIQUID 100 PER BOX UNFLAVORED SCH MG: at 09:22

## 2023-10-18 RX ADMIN — SODIUM CHLORIDE SCH MLS/HR: 9 INJECTION, SOLUTION INTRAVENOUS at 09:21

## 2023-10-18 RX ADMIN — MIDODRINE HYDROCHLORIDE SCH MG: 5 TABLET ORAL at 09:22

## 2023-10-18 RX ADMIN — PIPERACILLIN AND TAZOBACTAM SCH MLS/HR: 2; .25 INJECTION, POWDER, LYOPHILIZED, FOR SOLUTION INTRAVENOUS; PARENTERAL at 16:09

## 2023-10-19 VITALS — RESPIRATION RATE: 16 BRPM | OXYGEN SATURATION: 96 % | SYSTOLIC BLOOD PRESSURE: 123 MMHG | HEART RATE: 77 BPM

## 2023-10-19 VITALS
HEART RATE: 73 BPM | RESPIRATION RATE: 25 BRPM | OXYGEN SATURATION: 97 % | TEMPERATURE: 98.8 F | SYSTOLIC BLOOD PRESSURE: 114 MMHG

## 2023-10-19 VITALS — RESPIRATION RATE: 19 BRPM | SYSTOLIC BLOOD PRESSURE: 101 MMHG | HEART RATE: 73 BPM | OXYGEN SATURATION: 95 %

## 2023-10-19 VITALS
TEMPERATURE: 98.3 F | OXYGEN SATURATION: 94 % | RESPIRATION RATE: 19 BRPM | HEART RATE: 73 BPM | SYSTOLIC BLOOD PRESSURE: 104 MMHG

## 2023-10-19 VITALS — OXYGEN SATURATION: 94 % | SYSTOLIC BLOOD PRESSURE: 136 MMHG | RESPIRATION RATE: 25 BRPM | HEART RATE: 76 BPM

## 2023-10-19 VITALS
SYSTOLIC BLOOD PRESSURE: 99 MMHG | RESPIRATION RATE: 22 BRPM | HEART RATE: 76 BPM | OXYGEN SATURATION: 94 % | TEMPERATURE: 97.5 F

## 2023-10-19 VITALS — RESPIRATION RATE: 20 BRPM | OXYGEN SATURATION: 96 % | SYSTOLIC BLOOD PRESSURE: 111 MMHG | HEART RATE: 77 BPM

## 2023-10-19 VITALS
RESPIRATION RATE: 19 BRPM | TEMPERATURE: 98.2 F | HEART RATE: 79 BPM | OXYGEN SATURATION: 93 % | SYSTOLIC BLOOD PRESSURE: 127 MMHG

## 2023-10-19 VITALS — SYSTOLIC BLOOD PRESSURE: 112 MMHG | RESPIRATION RATE: 18 BRPM | HEART RATE: 75 BPM | OXYGEN SATURATION: 91 %

## 2023-10-19 VITALS — HEART RATE: 68 BPM | SYSTOLIC BLOOD PRESSURE: 117 MMHG | RESPIRATION RATE: 21 BRPM

## 2023-10-19 VITALS
TEMPERATURE: 97.9 F | HEART RATE: 77 BPM | RESPIRATION RATE: 16 BRPM | OXYGEN SATURATION: 96 % | SYSTOLIC BLOOD PRESSURE: 131 MMHG

## 2023-10-19 VITALS — OXYGEN SATURATION: 97 % | HEART RATE: 73 BPM | RESPIRATION RATE: 18 BRPM | SYSTOLIC BLOOD PRESSURE: 107 MMHG

## 2023-10-19 VITALS — RESPIRATION RATE: 18 BRPM | HEART RATE: 77 BPM | OXYGEN SATURATION: 94 % | SYSTOLIC BLOOD PRESSURE: 100 MMHG

## 2023-10-19 VITALS — HEART RATE: 75 BPM | RESPIRATION RATE: 17 BRPM | OXYGEN SATURATION: 80 % | SYSTOLIC BLOOD PRESSURE: 113 MMHG

## 2023-10-19 VITALS — TEMPERATURE: 98 F

## 2023-10-19 VITALS — SYSTOLIC BLOOD PRESSURE: 91 MMHG | OXYGEN SATURATION: 96 % | RESPIRATION RATE: 20 BRPM | HEART RATE: 65 BPM

## 2023-10-19 VITALS
SYSTOLIC BLOOD PRESSURE: 120 MMHG | HEART RATE: 103 BPM | RESPIRATION RATE: 20 BRPM | OXYGEN SATURATION: 96 % | TEMPERATURE: 97.8 F

## 2023-10-19 VITALS — SYSTOLIC BLOOD PRESSURE: 126 MMHG | OXYGEN SATURATION: 94 % | RESPIRATION RATE: 18 BRPM | HEART RATE: 74 BPM

## 2023-10-19 VITALS — HEART RATE: 79 BPM | RESPIRATION RATE: 19 BRPM | SYSTOLIC BLOOD PRESSURE: 107 MMHG | OXYGEN SATURATION: 96 %

## 2023-10-19 VITALS — SYSTOLIC BLOOD PRESSURE: 124 MMHG | HEART RATE: 73 BPM | RESPIRATION RATE: 23 BRPM

## 2023-10-19 VITALS — SYSTOLIC BLOOD PRESSURE: 107 MMHG | HEART RATE: 65 BPM | OXYGEN SATURATION: 93 % | RESPIRATION RATE: 20 BRPM

## 2023-10-19 VITALS — RESPIRATION RATE: 19 BRPM | SYSTOLIC BLOOD PRESSURE: 121 MMHG | HEART RATE: 73 BPM

## 2023-10-19 VITALS — OXYGEN SATURATION: 89 % | SYSTOLIC BLOOD PRESSURE: 102 MMHG | HEART RATE: 65 BPM | RESPIRATION RATE: 18 BRPM

## 2023-10-19 VITALS — SYSTOLIC BLOOD PRESSURE: 119 MMHG | OXYGEN SATURATION: 93 % | HEART RATE: 65 BPM | RESPIRATION RATE: 17 BRPM

## 2023-10-19 VITALS — OXYGEN SATURATION: 94 %

## 2023-10-19 LAB
ALBUMIN SERPL BCP-MCNC: 2.8 G/DL (ref 3.4–4.8)
ALT SERPL W P-5'-P-CCNC: 14 U/L (ref 12–78)
ANION GAP SERPL CALCULATED.3IONS-SCNC: 12 MMOL/L (ref 5–15)
AST SERPL W P-5'-P-CCNC: 19 U/L (ref 10–37)
BASOPHILS # BLD AUTO: 0 K/UL (ref 0–0.2)
BASOPHILS NFR BLD AUTO: 0.3 % (ref 0–2)
BILIRUB SERPL-MCNC: 3 MG/DL (ref 0–1)
BUN SERPL-MCNC: 49 MG/DL (ref 8–21)
CALCIUM SERPL-MCNC: 9.1 MG/DL (ref 8.4–11)
CHLORIDE SERPL-SCNC: 105 MMOL/L (ref 98–107)
CO2 SERPLBLD-SCNC: 25 MMOL/L (ref 23–29)
CREAT SERPL-MCNC: 1.89 MG/DL (ref 0.55–1.3)
DIGOXIN SERPL-MCNC: 0.6 NG/ML (ref 0.8–2)
EOSINOPHIL # BLD AUTO: 0.1 K/UL (ref 0–0.4)
EOSINOPHIL NFR BLD AUTO: 2.5 % (ref 0–4)
ERYTHROCYTE [DISTWIDTH] IN BLOOD BY AUTOMATED COUNT: 14.5 % (ref 9–15)
FLUBV AG UPPER RESP QL IA.RAPID: NEGATIVE
GFR SERPL CREATININE-BSD FRML MDRD: (no result) ML/MIN (ref 90–?)
GLUCOSE SERPL-MCNC: 96 MG/DL (ref 74–106)
HCT VFR BLD AUTO: 33.1 % (ref 36–48)
HGB BLD-MCNC: 10.8 G/DL (ref 12–16)
INR PPP: 1.3 (ref 0.8–1.2)
LYMPHOCYTES # BLD AUTO: 0.5 K/UL (ref 1–5.5)
LYMPHOCYTES NFR BLD AUTO: 10 % (ref 20.5–51.5)
MCH RBC QN AUTO: 33 PG (ref 27–31)
MCHC RBC AUTO-ENTMCNC: 33 % (ref 32–36)
MCV RBC AUTO: 102 FL (ref 79–98)
MONOCYTES # BLD MANUAL: 0.8 K/UL (ref 0–1)
MONOCYTES # BLD MANUAL: 15.5 % (ref 1.7–9.3)
NEUTROPHILS # BLD AUTO: 3.6 K/UL (ref 1.8–7.7)
NEUTROPHILS NFR BLD AUTO: 71.7 % (ref 40–70)
PHOSPHATE SERPL-MCNC: 3.4 MG/DL (ref 2.7–4.5)
PLATELET # BLD AUTO: 82 K/UL (ref 130–430)
POTASSIUM SERPL-SCNC: 4.2 MMOL/L (ref 3.5–5.1)
PROT SERPL-MCNC: 7 G/DL (ref 6.4–8.3)
PROTHROMBIN TIME: 13.7 SECS (ref 9.5–12.5)
RBC # BLD AUTO: 3.25 MIL/UL (ref 4.2–6.2)
SODIUM SERPLBLD-SCNC: 142 MMOL/L (ref 136–145)
WBC # BLD AUTO: 5 K/UL (ref 4.8–10.8)

## 2023-10-19 PROCEDURE — 5A09357 ASSISTANCE WITH RESPIRATORY VENTILATION, LESS THAN 24 CONSECUTIVE HOURS, CONTINUOUS POSITIVE AIRWAY PRESSURE: ICD-10-PCS | Performed by: INTERNAL MEDICINE

## 2023-10-19 RX ADMIN — PIPERACILLIN AND TAZOBACTAM SCH MLS/HR: 2; .25 INJECTION, POWDER, LYOPHILIZED, FOR SOLUTION INTRAVENOUS; PARENTERAL at 21:27

## 2023-10-19 RX ADMIN — DIGOXIN SCH MG: 125 TABLET ORAL at 09:23

## 2023-10-19 RX ADMIN — MINERAL SUPPLEMENT IRON 300 MG / 5 ML STRENGTH LIQUID 100 PER BOX UNFLAVORED SCH MG: at 09:26

## 2023-10-19 RX ADMIN — SODIUM CHLORIDE SCH MG: 9 INJECTION, SOLUTION INTRAVENOUS at 20:35

## 2023-10-19 RX ADMIN — SODIUM CHLORIDE SCH MLS/HR: 9 INJECTION, SOLUTION INTRAVENOUS at 13:36

## 2023-10-19 RX ADMIN — SODIUM CHLORIDE SCH MG: 9 INJECTION, SOLUTION INTRAVENOUS at 09:26

## 2023-10-19 RX ADMIN — MIDODRINE HYDROCHLORIDE SCH MG: 5 TABLET ORAL at 15:55

## 2023-10-19 RX ADMIN — SODIUM CHLORIDE SCH MLS/HR: 0.9 INJECTION, SOLUTION INTRAVENOUS at 13:29

## 2023-10-19 RX ADMIN — Medication SCH MG: at 13:29

## 2023-10-19 RX ADMIN — Medication SCH UNIT: at 09:25

## 2023-10-19 RX ADMIN — BUMETANIDE SCH MG: 1 TABLET ORAL at 09:24

## 2023-10-19 RX ADMIN — MIDODRINE HYDROCHLORIDE SCH MG: 5 TABLET ORAL at 20:35

## 2023-10-19 RX ADMIN — BUMETANIDE SCH MG: 1 TABLET ORAL at 20:36

## 2023-10-19 RX ADMIN — PIPERACILLIN AND TAZOBACTAM SCH MLS/HR: 2; .25 INJECTION, POWDER, LYOPHILIZED, FOR SOLUTION INTRAVENOUS; PARENTERAL at 13:28

## 2023-10-19 RX ADMIN — PIPERACILLIN AND TAZOBACTAM SCH MLS/HR: 2; .25 INJECTION, POWDER, LYOPHILIZED, FOR SOLUTION INTRAVENOUS; PARENTERAL at 15:56

## 2023-10-19 RX ADMIN — MIDODRINE HYDROCHLORIDE SCH MG: 5 TABLET ORAL at 09:23

## 2023-10-19 RX ADMIN — PIPERACILLIN AND TAZOBACTAM SCH MLS/HR: 2; .25 INJECTION, POWDER, LYOPHILIZED, FOR SOLUTION INTRAVENOUS; PARENTERAL at 05:29

## 2023-10-20 VITALS — OXYGEN SATURATION: 95 %

## 2023-10-20 VITALS
HEART RATE: 77 BPM | RESPIRATION RATE: 20 BRPM | SYSTOLIC BLOOD PRESSURE: 124 MMHG | TEMPERATURE: 97.8 F | OXYGEN SATURATION: 97 %

## 2023-10-20 VITALS
SYSTOLIC BLOOD PRESSURE: 118 MMHG | RESPIRATION RATE: 16 BRPM | OXYGEN SATURATION: 95 % | TEMPERATURE: 97.5 F | HEART RATE: 66 BPM

## 2023-10-20 VITALS
SYSTOLIC BLOOD PRESSURE: 125 MMHG | RESPIRATION RATE: 20 BRPM | HEART RATE: 72 BPM | OXYGEN SATURATION: 96 % | TEMPERATURE: 96.9 F

## 2023-10-20 VITALS
TEMPERATURE: 97.2 F | SYSTOLIC BLOOD PRESSURE: 122 MMHG | RESPIRATION RATE: 19 BRPM | HEART RATE: 75 BPM | OXYGEN SATURATION: 97 %

## 2023-10-20 LAB
ALBUMIN SERPL BCP-MCNC: 2.8 G/DL (ref 3.4–4.8)
ALT SERPL W P-5'-P-CCNC: 15 U/L (ref 12–78)
ANION GAP SERPL CALCULATED.3IONS-SCNC: 11 MMOL/L (ref 5–15)
AST SERPL W P-5'-P-CCNC: 17 U/L (ref 10–37)
BASOPHILS # BLD AUTO: 0 K/UL (ref 0–0.2)
BASOPHILS NFR BLD AUTO: 0.8 % (ref 0–2)
BILIRUB SERPL-MCNC: 2.7 MG/DL (ref 0–1)
BUN SERPL-MCNC: 47 MG/DL (ref 8–21)
CALCIUM SERPL-MCNC: 9.4 MG/DL (ref 8.4–11)
CHLORIDE SERPL-SCNC: 104 MMOL/L (ref 98–107)
CHOLEST SERPL-MCNC: 74 MG/DL (ref ?–200)
CO2 SERPLBLD-SCNC: 26 MMOL/L (ref 23–29)
CREAT SERPL-MCNC: 1.78 MG/DL (ref 0.55–1.3)
EOSINOPHIL # BLD AUTO: 0.1 K/UL (ref 0–0.4)
EOSINOPHIL NFR BLD AUTO: 3 % (ref 0–4)
ERYTHROCYTE [DISTWIDTH] IN BLOOD BY AUTOMATED COUNT: 14.6 % (ref 9–15)
GFR SERPL CREATININE-BSD FRML MDRD: (no result) ML/MIN (ref 90–?)
GLUCOSE SERPL-MCNC: 91 MG/DL (ref 74–106)
HCT VFR BLD AUTO: 34.1 % (ref 36–48)
HDLC SERPL-MCNC: 31 MG/DL (ref 55–?)
HGB BLD-MCNC: 11.1 G/DL (ref 12–16)
LYMPHOCYTES # BLD AUTO: 0.5 K/UL (ref 1–5.5)
LYMPHOCYTES NFR BLD AUTO: 13 % (ref 20.5–51.5)
MCH RBC QN AUTO: 33 PG (ref 27–31)
MCHC RBC AUTO-ENTMCNC: 33 % (ref 32–36)
MCV RBC AUTO: 102 FL (ref 79–98)
MONOCYTES # BLD MANUAL: 0.9 K/UL (ref 0–1)
MONOCYTES # BLD MANUAL: 22 % (ref 1.7–9.3)
NEUTROPHILS # BLD AUTO: 2.5 K/UL (ref 1.8–7.7)
NEUTROPHILS NFR BLD AUTO: 61.2 % (ref 40–70)
PHOSPHATE SERPL-MCNC: 3.3 MG/DL (ref 2.7–4.5)
PLATELET # BLD AUTO: 89 K/UL (ref 130–430)
POTASSIUM SERPL-SCNC: 3.7 MMOL/L (ref 3.5–5.1)
PROT SERPL-MCNC: 7.3 G/DL (ref 6.4–8.3)
RBC # BLD AUTO: 3.36 MIL/UL (ref 4.2–6.2)
SODIUM SERPLBLD-SCNC: 141 MMOL/L (ref 136–145)
TRIGL SERPL-MCNC: 50 MG/DL (ref 30–150)
WBC # BLD AUTO: 4 K/UL (ref 4.8–10.8)

## 2023-10-20 RX ADMIN — SODIUM CHLORIDE SCH MG: 9 INJECTION, SOLUTION INTRAVENOUS at 21:48

## 2023-10-20 RX ADMIN — MINERAL SUPPLEMENT IRON 300 MG / 5 ML STRENGTH LIQUID 100 PER BOX UNFLAVORED SCH MG: at 09:40

## 2023-10-20 RX ADMIN — BUMETANIDE SCH MG: 1 TABLET ORAL at 09:42

## 2023-10-20 RX ADMIN — PIPERACILLIN AND TAZOBACTAM SCH MLS/HR: 2; .25 INJECTION, POWDER, LYOPHILIZED, FOR SOLUTION INTRAVENOUS; PARENTERAL at 05:01

## 2023-10-20 RX ADMIN — Medication SCH UNIT: at 09:41

## 2023-10-20 RX ADMIN — SODIUM CHLORIDE SCH MG: 9 INJECTION, SOLUTION INTRAVENOUS at 09:41

## 2023-10-20 RX ADMIN — BUMETANIDE SCH MG: 1 TABLET ORAL at 21:00

## 2023-10-20 RX ADMIN — Medication SCH MG: at 09:41

## 2023-10-20 RX ADMIN — PIPERACILLIN AND TAZOBACTAM SCH MLS/HR: 2; .25 INJECTION, POWDER, LYOPHILIZED, FOR SOLUTION INTRAVENOUS; PARENTERAL at 09:41

## 2023-10-20 RX ADMIN — MIDODRINE HYDROCHLORIDE SCH MG: 5 TABLET ORAL at 09:41

## 2023-10-20 RX ADMIN — DIGOXIN SCH MG: 125 TABLET ORAL at 09:40

## 2023-10-20 RX ADMIN — MIDODRINE HYDROCHLORIDE SCH MG: 5 TABLET ORAL at 16:43

## 2023-10-20 RX ADMIN — SODIUM CHLORIDE SCH MLS/HR: 9 INJECTION, SOLUTION INTRAVENOUS at 13:20

## 2023-10-20 RX ADMIN — MIDODRINE HYDROCHLORIDE SCH MG: 5 TABLET ORAL at 21:48

## 2023-10-21 VITALS — SYSTOLIC BLOOD PRESSURE: 126 MMHG | HEART RATE: 73 BPM | OXYGEN SATURATION: 97 %

## 2023-10-21 VITALS — OXYGEN SATURATION: 97 %

## 2023-10-21 VITALS
SYSTOLIC BLOOD PRESSURE: 129 MMHG | HEART RATE: 70 BPM | OXYGEN SATURATION: 98 % | RESPIRATION RATE: 18 BRPM | TEMPERATURE: 98.1 F

## 2023-10-21 VITALS
RESPIRATION RATE: 18 BRPM | TEMPERATURE: 97.8 F | SYSTOLIC BLOOD PRESSURE: 123 MMHG | HEART RATE: 77 BPM | OXYGEN SATURATION: 97 %

## 2023-10-21 VITALS
TEMPERATURE: 97.4 F | RESPIRATION RATE: 17 BRPM | HEART RATE: 73 BPM | SYSTOLIC BLOOD PRESSURE: 126 MMHG | OXYGEN SATURATION: 96 %

## 2023-10-21 VITALS
SYSTOLIC BLOOD PRESSURE: 142 MMHG | RESPIRATION RATE: 16 BRPM | HEART RATE: 72 BPM | OXYGEN SATURATION: 98 % | TEMPERATURE: 97.7 F

## 2023-10-21 VITALS
OXYGEN SATURATION: 98 % | HEART RATE: 75 BPM | TEMPERATURE: 97.5 F | SYSTOLIC BLOOD PRESSURE: 125 MMHG | RESPIRATION RATE: 20 BRPM

## 2023-10-21 VITALS
OXYGEN SATURATION: 98 % | RESPIRATION RATE: 16 BRPM | TEMPERATURE: 97.7 F | SYSTOLIC BLOOD PRESSURE: 142 MMHG | HEART RATE: 72 BPM

## 2023-10-21 VITALS
OXYGEN SATURATION: 95 % | HEART RATE: 72 BPM | RESPIRATION RATE: 18 BRPM | TEMPERATURE: 97.3 F | SYSTOLIC BLOOD PRESSURE: 127 MMHG

## 2023-10-21 VITALS — OXYGEN SATURATION: 95 %

## 2023-10-21 LAB
ALBUMIN SERPL BCP-MCNC: 2.7 G/DL (ref 3.4–4.8)
ALT SERPL W P-5'-P-CCNC: 12 U/L (ref 12–78)
ANION GAP SERPL CALCULATED.3IONS-SCNC: 13 MMOL/L (ref 5–15)
AST SERPL W P-5'-P-CCNC: 16 U/L (ref 10–37)
BASOPHILS # BLD AUTO: 0 K/UL (ref 0–0.2)
BASOPHILS NFR BLD AUTO: 0.9 % (ref 0–2)
BILIRUB SERPL-MCNC: 2.4 MG/DL (ref 0–1)
BUN SERPL-MCNC: 44 MG/DL (ref 8–21)
CALCIUM SERPL-MCNC: 9.5 MG/DL (ref 8.4–11)
CHLORIDE SERPL-SCNC: 104 MMOL/L (ref 98–107)
CO2 SERPLBLD-SCNC: 26 MMOL/L (ref 23–29)
CREAT SERPL-MCNC: 1.51 MG/DL (ref 0.55–1.3)
EOSINOPHIL # BLD AUTO: 0.1 K/UL (ref 0–0.4)
EOSINOPHIL NFR BLD AUTO: 4.3 % (ref 0–4)
ERYTHROCYTE [DISTWIDTH] IN BLOOD BY AUTOMATED COUNT: 14.5 % (ref 9–15)
GFR SERPL CREATININE-BSD FRML MDRD: (no result) ML/MIN (ref 90–?)
GLUCOSE SERPL-MCNC: 85 MG/DL (ref 74–106)
HCT VFR BLD AUTO: 33.3 % (ref 36–48)
HGB BLD-MCNC: 10.9 G/DL (ref 12–16)
INR PPP: 1.5 (ref 0.8–1.2)
LYMPHOCYTES # BLD AUTO: 0.5 K/UL (ref 1–5.5)
LYMPHOCYTES NFR BLD AUTO: 16.5 % (ref 20.5–51.5)
MCH RBC QN AUTO: 33 PG (ref 27–31)
MCHC RBC AUTO-ENTMCNC: 33 % (ref 32–36)
MCV RBC AUTO: 101 FL (ref 79–98)
MONOCYTES # BLD MANUAL: 0.8 K/UL (ref 0–1)
MONOCYTES # BLD MANUAL: 27.2 % (ref 1.7–9.3)
NEUTROPHILS # BLD AUTO: 1.5 K/UL (ref 1.8–7.7)
NEUTROPHILS NFR BLD AUTO: 51.1 % (ref 40–70)
PHOSPHATE SERPL-MCNC: 3.5 MG/DL (ref 2.7–4.5)
PLATELET # BLD AUTO: 99 K/UL (ref 130–430)
POTASSIUM SERPL-SCNC: 3.4 MMOL/L (ref 3.5–5.1)
PROT SERPL-MCNC: 7.4 G/DL (ref 6.4–8.3)
PROTHROMBIN TIME: 14.8 SECS (ref 9.5–12.5)
RBC # BLD AUTO: 3.29 MIL/UL (ref 4.2–6.2)
SODIUM SERPLBLD-SCNC: 143 MMOL/L (ref 136–145)
WBC # BLD AUTO: 3 K/UL (ref 4.8–10.8)

## 2023-10-21 RX ADMIN — MIDODRINE HYDROCHLORIDE SCH MG: 5 TABLET ORAL at 17:03

## 2023-10-21 RX ADMIN — BUMETANIDE SCH MG: 1 TABLET ORAL at 08:59

## 2023-10-21 RX ADMIN — BUMETANIDE SCH MG: 1 TABLET ORAL at 20:45

## 2023-10-21 RX ADMIN — MINERAL SUPPLEMENT IRON 300 MG / 5 ML STRENGTH LIQUID 100 PER BOX UNFLAVORED SCH MG: at 08:58

## 2023-10-21 RX ADMIN — Medication SCH MG: at 08:59

## 2023-10-21 RX ADMIN — DIGOXIN SCH MG: 125 TABLET ORAL at 08:59

## 2023-10-21 RX ADMIN — SODIUM CHLORIDE SCH MLS/HR: 9 INJECTION, SOLUTION INTRAVENOUS at 14:04

## 2023-10-21 RX ADMIN — MIDODRINE HYDROCHLORIDE SCH MG: 5 TABLET ORAL at 20:45

## 2023-10-21 RX ADMIN — IPRATROPIUM BROMIDE AND ALBUTEROL SULFATE SCH ML: .5; 3 SOLUTION RESPIRATORY (INHALATION) at 23:05

## 2023-10-21 RX ADMIN — SODIUM CHLORIDE SCH MG: 9 INJECTION, SOLUTION INTRAVENOUS at 09:00

## 2023-10-21 RX ADMIN — Medication SCH CAP: at 20:45

## 2023-10-21 RX ADMIN — MIDODRINE HYDROCHLORIDE SCH MG: 5 TABLET ORAL at 08:58

## 2023-10-21 RX ADMIN — Medication SCH UNIT: at 08:58

## 2023-10-21 RX ADMIN — SODIUM CHLORIDE SCH MG: 9 INJECTION, SOLUTION INTRAVENOUS at 20:45

## 2023-10-22 VITALS
TEMPERATURE: 98.1 F | RESPIRATION RATE: 18 BRPM | OXYGEN SATURATION: 98 % | HEART RATE: 71 BPM | SYSTOLIC BLOOD PRESSURE: 124 MMHG

## 2023-10-22 VITALS
SYSTOLIC BLOOD PRESSURE: 123 MMHG | OXYGEN SATURATION: 96 % | RESPIRATION RATE: 17 BRPM | TEMPERATURE: 97.7 F | HEART RATE: 72 BPM

## 2023-10-22 VITALS
HEART RATE: 71 BPM | RESPIRATION RATE: 17 BRPM | OXYGEN SATURATION: 98 % | TEMPERATURE: 98.1 F | SYSTOLIC BLOOD PRESSURE: 132 MMHG

## 2023-10-22 VITALS
RESPIRATION RATE: 18 BRPM | HEART RATE: 71 BPM | TEMPERATURE: 98.1 F | OXYGEN SATURATION: 98 % | SYSTOLIC BLOOD PRESSURE: 124 MMHG

## 2023-10-22 VITALS — OXYGEN SATURATION: 95 %

## 2023-10-22 VITALS
HEART RATE: 70 BPM | SYSTOLIC BLOOD PRESSURE: 123 MMHG | RESPIRATION RATE: 18 BRPM | TEMPERATURE: 98.4 F | OXYGEN SATURATION: 95 %

## 2023-10-22 LAB
ALBUMIN SERPL BCP-MCNC: 2.7 G/DL (ref 3.4–4.8)
ALT SERPL W P-5'-P-CCNC: 12 U/L (ref 12–78)
ANION GAP SERPL CALCULATED.3IONS-SCNC: 8 MMOL/L (ref 5–15)
AST SERPL W P-5'-P-CCNC: 18 U/L (ref 10–37)
BASOPHILS # BLD AUTO: 0 K/UL (ref 0–0.2)
BASOPHILS NFR BLD AUTO: 0.7 % (ref 0–2)
BILIRUB SERPL-MCNC: 1.5 MG/DL (ref 0–1)
BUN SERPL-MCNC: 39 MG/DL (ref 8–21)
CALCIUM SERPL-MCNC: 9.1 MG/DL (ref 8.4–11)
CHLORIDE SERPL-SCNC: 105 MMOL/L (ref 98–107)
CO2 SERPLBLD-SCNC: 28 MMOL/L (ref 23–29)
CREAT SERPL-MCNC: 1.2 MG/DL (ref 0.55–1.3)
DIGOXIN SERPL-MCNC: 0.8 NG/ML (ref 0.8–2)
EOSINOPHIL # BLD AUTO: 0.2 K/UL (ref 0–0.4)
EOSINOPHIL NFR BLD AUTO: 4.8 % (ref 0–4)
ERYTHROCYTE [DISTWIDTH] IN BLOOD BY AUTOMATED COUNT: 14.4 % (ref 9–15)
GFR SERPL CREATININE-BSD FRML MDRD: (no result) ML/MIN (ref 90–?)
GLUCOSE SERPL-MCNC: 89 MG/DL (ref 74–106)
HCT VFR BLD AUTO: 32.5 % (ref 36–48)
HGB BLD-MCNC: 10.5 G/DL (ref 12–16)
LYMPHOCYTES # BLD AUTO: 0.4 K/UL (ref 1–5.5)
LYMPHOCYTES NFR BLD AUTO: 12.2 % (ref 20.5–51.5)
MCH RBC QN AUTO: 33 PG (ref 27–31)
MCHC RBC AUTO-ENTMCNC: 32 % (ref 32–36)
MCV RBC AUTO: 102 FL (ref 79–98)
MONOCYTES # BLD MANUAL: 0.8 K/UL (ref 0–1)
MONOCYTES # BLD MANUAL: 20.9 % (ref 1.7–9.3)
NEUTROPHILS # BLD AUTO: 2.2 K/UL (ref 1.8–7.7)
NEUTROPHILS NFR BLD AUTO: 61.4 % (ref 40–70)
PLATELET # BLD AUTO: 112 K/UL (ref 130–430)
POTASSIUM SERPL-SCNC: 3.6 MMOL/L (ref 3.5–5.1)
PROT SERPL-MCNC: 7 G/DL (ref 6.4–8.3)
RBC # BLD AUTO: 3.2 MIL/UL (ref 4.2–6.2)
SODIUM SERPLBLD-SCNC: 141 MMOL/L (ref 136–145)
WBC # BLD AUTO: 3.6 K/UL (ref 4.8–10.8)

## 2023-10-22 RX ADMIN — Medication SCH CAP: at 09:01

## 2023-10-22 RX ADMIN — Medication SCH MG: at 08:59

## 2023-10-22 RX ADMIN — IPRATROPIUM BROMIDE AND ALBUTEROL SULFATE SCH ML: .5; 3 SOLUTION RESPIRATORY (INHALATION) at 08:04

## 2023-10-22 RX ADMIN — MINERAL SUPPLEMENT IRON 300 MG / 5 ML STRENGTH LIQUID 100 PER BOX UNFLAVORED SCH MG: at 09:01

## 2023-10-22 RX ADMIN — Medication SCH UNIT: at 08:59

## 2023-10-22 RX ADMIN — MIDODRINE HYDROCHLORIDE SCH MG: 5 TABLET ORAL at 09:00

## 2023-10-22 RX ADMIN — BUMETANIDE SCH MG: 1 TABLET ORAL at 09:02

## 2023-10-22 RX ADMIN — DIGOXIN SCH MG: 125 TABLET ORAL at 09:00

## 2023-10-22 RX ADMIN — SODIUM CHLORIDE SCH MG: 9 INJECTION, SOLUTION INTRAVENOUS at 09:02
